# Patient Record
Sex: MALE | Race: WHITE | NOT HISPANIC OR LATINO | Employment: PART TIME | URBAN - METROPOLITAN AREA
[De-identification: names, ages, dates, MRNs, and addresses within clinical notes are randomized per-mention and may not be internally consistent; named-entity substitution may affect disease eponyms.]

---

## 2017-06-05 ENCOUNTER — TRANSCRIBE ORDERS (OUTPATIENT)
Dept: SLEEP CENTER | Facility: CLINIC | Age: 57
End: 2017-06-05

## 2017-06-05 ENCOUNTER — HOSPITAL ENCOUNTER (OUTPATIENT)
Dept: SLEEP CENTER | Facility: CLINIC | Age: 57
Discharge: HOME | End: 2017-06-05
Payer: COMMERCIAL

## 2017-06-05 DIAGNOSIS — G47.33 OBSTRUCTIVE SLEEP APNEA (ADULT) (PEDIATRIC): ICD-10-CM

## 2017-06-05 DIAGNOSIS — G47.33 OBSTRUCTIVE SLEEP APNEA (ADULT) (PEDIATRIC): Primary | ICD-10-CM

## 2018-03-06 ENCOUNTER — OFFICE VISIT (OUTPATIENT)
Dept: FAMILY MEDICINE CLINIC | Facility: CLINIC | Age: 58
End: 2018-03-06
Payer: COMMERCIAL

## 2018-03-06 VITALS
WEIGHT: 187.6 LBS | HEART RATE: 80 BPM | TEMPERATURE: 97.7 F | HEIGHT: 68 IN | BODY MASS INDEX: 28.43 KG/M2 | SYSTOLIC BLOOD PRESSURE: 124 MMHG | DIASTOLIC BLOOD PRESSURE: 68 MMHG

## 2018-03-06 DIAGNOSIS — M54.50 ACUTE RIGHT-SIDED LOW BACK PAIN WITHOUT SCIATICA: Primary | ICD-10-CM

## 2018-03-06 DIAGNOSIS — K42.9 UMBILICAL HERNIA WITHOUT OBSTRUCTION AND WITHOUT GANGRENE: ICD-10-CM

## 2018-03-06 PROCEDURE — 3008F BODY MASS INDEX DOCD: CPT | Performed by: FAMILY MEDICINE

## 2018-03-06 PROCEDURE — 99213 OFFICE O/P EST LOW 20 MIN: CPT | Performed by: FAMILY MEDICINE

## 2018-03-06 RX ORDER — ETANERCEPT 50 MG/ML
SOLUTION SUBCUTANEOUS
COMMUNITY
Start: 2018-02-23 | End: 2022-03-22 | Stop reason: SDUPTHER

## 2018-03-06 RX ORDER — METHOCARBAMOL 500 MG/1
TABLET, FILM COATED ORAL
Qty: 30 TABLET | Refills: 1 | Status: SHIPPED | OUTPATIENT
Start: 2018-03-06 | End: 2018-10-01

## 2018-03-06 RX ORDER — AMINO ACIDS/MV,IRON,MIN
1 TABLET ORAL DAILY
COMMUNITY

## 2018-03-06 NOTE — PROGRESS NOTES
FAMILY PRACTICE OFFICE VISIT       NAME: Ashley Reyes  AGE: 59 y o  SEX: male       : 1960        MRN: [id number]    DATE: 3/6/2018  TIME: 12:17 PM    Assessment and Plan     Problem List Items Addressed This Visit     Umbilical hernia     Umbilical hernia  Patient will follow up with general surgeon to have hernia repair in the near future  Acute right-sided low back pain without sciatica - Primary    Relevant Medications    methocarbamol (ROBAXIN) 500 mg tablet          Patient given prescription for muscle relaxer for his back to use t i d  p r n  He may also use heat to the affected area  The patient will be holding off on any strenuous activities that he was performing previous to his umbilical hernia symptoms  Patient may use over-the-counter Aleve with food for pain relief  There are no Patient Instructions on file for this visit  Chief Complaint     Chief Complaint   Patient presents with    Follow-up     Pt was evaluated in [facility] ER on 2018 for hernia  Pt states that they replaced the hernia into place and suggested he f/u with PCP  Pt states that he was prescribed medication for pain but did not have the script filled  History of Present Illness     Patient has lost 30 pound with diet and exercise over the past 3 months  Unfortunately he had a prior umbilical hernia that became symptomatic and was seen in emergency room  Since emergency room visit he has been feeling some right-sided back pain which she feels is related to his change in gait  He denies any acute trauma  He is scheduled to see general surgeon soon to schedule her hernia surgery  Review of Systems   Review of Systems   Constitutional: Negative  HENT: Negative  Eyes: Negative  Respiratory: Negative  Cardiovascular: Negative  Gastrointestinal: Negative  Genitourinary: Negative  Musculoskeletal:        As per HPI    Patient states overall his psoriatic arthritic pains have been improved since the weight loss that he has not been using his Enbrel injections for few weeks now   Skin: Negative  Active Problem List     Patient Active Problem List   Diagnosis    Gastroesophageal reflux disease with esophagitis    GERD (gastroesophageal reflux disease)    Obstructive sleep apnea    Psoriasis    [facility]atic arthritis ([facility] )    Sjogren's syndrome ([facility] )    Umbilical hernia    Acute right-sided low back pain without sciatica       Past Medical History:  No past medical history on file  Past Surgical History:  No past surgical history on file  Family History:  No family history on file  Social History:  Social History     Social History    Marital status: /Civil Union     Spouse name: N/A    Number of children: N/A    Years of education: N/A     Occupational History    Not on file  Social History Main Topics    Smoking status: Current Every Day Smoker    Smokeless tobacco: Never Used    Alcohol use Not on file    Drug use: Unknown    Sexual activity: Not on file     Other Topics Concern    Not on file     Social History Narrative    No narrative on file       Objective     Vitals:    03/06/18 1132   BP: 124/68   Pulse: 80   Temp: 97 7 °F (36 5 °C)     Wt Readings from Last 3 Encounters:   03/06/18 85 1 kg (187 lb 9 6 oz)   08/04/15 93 5 kg (206 lb 2 1 oz)   02/13/15 93 2 kg (205 lb 8 oz)       Physical Exam   Constitutional: No distress  Cardiovascular:   Regular rate and rhythm with no murmurs   Pulmonary/Chest:   Lungs are clear to auscultation without wheezes,rales, or rhonchi   Abdominal:   Abdomen was soft with positive bowel sounds  Patient had mild generalized tenderness along the umbilical area where hernia present  No guarding or rebound    No masses palpated       Pertinent Laboratory/Diagnostic Studies:  Lab Results   Component Value Date    GLUCOSE 100 07/31/2014    BUN 13 07/31/2014    CREATININE 0 84 07/31/2014    CALCIUM 9 6 07/31/2014     07/31/2014    K 4 4 07/31/2014    CO2 31 07/31/2014     07/31/2014     Lab Results   Component Value Date    ALT 35 07/31/2014    AST 24 07/31/2014    ALKPHOS 99 07/31/2014    BILITOT 0 80 07/31/2014       Lab Results   Component Value Date    WBC 6 73 07/31/2014    HGB 15 4 07/31/2014    HCT 45 8 07/31/2014    MCV 92 07/31/2014     07/31/2014       No results found for: TSH    Lab Results   Component Value Date    CHOL 215 09/30/2014     Lab Results   Component Value Date    TRIG 134 09/30/2014     Lab Results   Component Value Date    HDL 47 09/30/2014     Lab Results   Component Value Date    LDLCALC 141 (H) 09/30/2014     No results found for: HGBA1C    Results for orders placed or performed in visit on 09/30/14   URINALYSIS WITH REFLEX TO MICROSCOPIC (HISTORICAL)   Result Value Ref Range    Color, UA Yellow     Clarity, UA Clear     Specific [place], UA 1 024 1 003 - 1 030    pH, UA 7 0 4 5 - 8 0    Glucose, UA Negative Negative mg/dL    Ketones, UA Negative Negative mg/dL    Blood, UA Negative Negative    Protein, UA Negative Negative mg/dL    Nitrite, UA Negative Negative    Bilirubin, UA Negative Negative    Urobilinogen, UA 0 2 0 2,1 0 E U /dl    Leukocytes, UA Negative Negative   PSA,TOTAL SCREEN (HISTORICAL)   Result Value Ref Range    PSA 0 9 0 0 - 4 0 ng/mL   LIPID PANEL (HISTORICAL)   Result Value Ref Range    Triglycerides 134 mg/dL    Cholesterol 215 mg/dL    HDL 47 mg/dL    LDL Calculated 141 (H) 0 - 100 mg/dL       No orders of the defined types were placed in this encounter  ALLERGIES:  No Known Allergies    Current Medications     Current Outpatient Prescriptions   Medication Sig Dispense Refill    ENBREL 50 MG/ML SOSY       Multiple Vitamins-Minerals (OCUVITE EXTRA) TABS Take 1 tablet by mouth daily      methocarbamol (ROBAXIN) 500 mg tablet One po tid prn 30 tablet 1     No current facility-administered medications for this visit            Health Maintenance     Health Maintenance   Topic Date Due    HIV SCREENING  1960    Hepatitis C Screening  1960    COLONOSCOPY  1960    Depression Screening PHQ-9  05/22/1970    DTaP,Tdap,and Td Vaccines (3 - Tdap) 02/04/2016    INFLUENZA VACCINE  09/01/2017    PNEUMOCOCCAL POLYSACCHARIDE VACCINE AGE 2-64 HIGH RISK  Completed     Immunization History   Administered Date(s) Administered    Pneumococcal Polysaccharide PPV23 11/21/2005, 08/04/2015    Td (adult), adsorbed 10/01/2005, 57/01/3755       Avinash Leung MD

## 2018-03-06 NOTE — ASSESSMENT & PLAN NOTE
Umbilical hernia  Patient will follow up with general surgeon to have hernia repair in the near future

## 2018-06-14 ENCOUNTER — OFFICE VISIT (OUTPATIENT)
Dept: SLEEP CENTER | Facility: CLINIC | Age: 58
End: 2018-06-14
Payer: COMMERCIAL

## 2018-06-14 VITALS
SYSTOLIC BLOOD PRESSURE: 114 MMHG | HEART RATE: 80 BPM | BODY MASS INDEX: 26.04 KG/M2 | HEIGHT: 68 IN | WEIGHT: 171.8 LBS | DIASTOLIC BLOOD PRESSURE: 60 MMHG

## 2018-06-14 DIAGNOSIS — G47.33 OBSTRUCTIVE SLEEP APNEA: Primary | ICD-10-CM

## 2018-06-14 PROCEDURE — 99214 OFFICE O/P EST MOD 30 MIN: CPT | Performed by: NURSE PRACTITIONER

## 2018-06-14 RX ORDER — RANITIDINE 150 MG/1
150 CAPSULE ORAL
COMMUNITY
End: 2020-06-03 | Stop reason: ALTCHOICE

## 2018-06-14 NOTE — PATIENT INSTRUCTIONS
1   Continue use of CPAP equipment nightly  1 5  Take equipment to [facility] for pressure change and download after one month at 7cm  2   Continue to clean your equipment as discussed  3  Contact the [facility] with any questions or concerns prior to his next visit as needed  4    Schedule visit for follow-up in 1 year

## 2018-06-14 NOTE — PROGRESS NOTES
Progress Note - [address] [facility] 59 y o  male   QAS:[id number]/56/7406, MRN: [id number]  6/14/2018          Follow Up Evaluation / Problem:     Obstructive Sleep Apnea    HPI: Ced Zuluaga is a 59y o  year old male  He presents today for follow up of obstructive sleep apnea  He was diagnosed with very mild sleep apnea in 2007  He has successfully used CPAP since that time, which controls his snoring and allows him to awaken feeling refreshed  He reports that he receives supplies from [employer] and cleans his equipment routinely  Review of Systems      Genitourinary none   Cardiology none   Gastrointestinal none   Neurology none   Constitutional weight change   Integumentary none   Psychiatry none   Musculoskeletal joint pain and back pain   Pulmonary none   ENT none   Endocrine none   Hematological none       Current Outpatient Prescriptions:     ENBREL 50 MG/ML SOSY, , Disp: , Rfl:     Misc Natural Products (PROSTATE HEALTH PO), Take by mouth, Disp: , Rfl:     Multiple Vitamins-Minerals (CENTRUM SILVER 50+MEN) TABS, Take 1 tablet by mouth, Disp: , Rfl:     Multiple Vitamins-Minerals (OCUVITE EXTRA) TABS, Take 1 tablet by mouth daily, Disp: , Rfl:     ranitidine (ZANTAC) 150 MG capsule, Take 150 mg by mouth, Disp: , Rfl:     methocarbamol (ROBAXIN) 500 mg tablet, One po tid prn, Disp: 30 tablet, Rfl: 1    Gray Sleepiness Scale  Sitting and reading: Would never doze  Watching TV: Would never doze  Sitting, inactive in a public place (e g  a theatre or a meeting):  Would never doze  As a passenger in a car for an hour without a break: Would never doze  Lying down to rest in the afternoon when circumstances permit: Would never doze  Sitting and talking to someone: Would never doze  Sitting quietly after a lunch without alcohol: Would never doze  In a car, while stopped for a few minutes in traffic: Would never doze  Total score: 0              Vitals:    06/14/18 0800   BP: 114/60   Pulse: 80   Weight: 77 9 kg (171 lb 12 8 oz)   Height: 5' 8" (1 727 m)       Body mass index is 26 12 kg/m²  Neck Circumference: 36 5 (cm)       EPWORTH SLEEPINESS SCORE  Total score: 0      Past History Since Last Sleep Center Visit:     Since his last visit, he reports that he has lost approximately 50 lbs  using lifestyle changes to diet and exercise  He continues to use his CPAP nightly and has not noticed any increased pressure sensation with its use  He feels refreshed upon awakening  The review of systems and following portions of the patient's history were reviewed and updated as appropriate: allergies, current medications, past family history, past medical history, past social history, past surgical history, and problem list     OBJECTIVE    PAP Pressure: Nasal CPAP set to deliver 11 cm of water pressure plan decrease to 7cm  DME Provider: [facility]    Physical Exam:     General Appearance:   Alert, cooperative, no distress, appears stated age     Head:   Normocephalic, without obvious abnormality, atraumatic     Eyes:   PERRL, conjunctiva/corneas clear, EOM's intact          Nose:  Nares normal, septum midline, no drainage or sinus tenderness           Throat:  Lips, teeth and gums normal; tongue normal size and  shape and midline mucosa moist and redundant bilaterally, uvula thick and partially visible, tonsils not visualized, Mallampati class 4       Neck:  Supple, symmetrical, trachea midline, no adenopathy;  Thyroid: No enlargement, tenderness or nodules; no carotid bruit or JVD     Lungs:      Clear to auscultation bilaterally, respirations unlabored     Heart:   Regular rate and rhythm, S1 and S2 normal, no murmur, rub or gallop       Extremities:  Extremities normal, atraumatic, no cyanosis or edema     Pulses:  2+ and symmetric all extremities     Skin:  Skin color, texture, turgor normal, no rashes or lesions       Neurologic:  CNII-XII intact  Normal strength, sensation throughout       ASSESSMENT / PLAN    1  Obstructive sleep apnea  PAP DME Pressure Change    PAP DME Resupply/Reorder           Counseling / Coordination of Care  Total clinic time spent today 25 minutes  Greater than 50% of total time was spent with the patient and / or family counseling and / or coordination of care  A description of the counseling / coordination of care:     Impressions, Diagnostic results, Prognosis, Instructions for management, Risks and benefits of treatment, Patient and family education, Risk factor reductions and Importance of compliance with treatment    Today we discussed his continued use of CPAP  He currently feels comfortable using CPAP  We reviewed his sleep study results from 2007 which showed very mild obstructive sleep apnea  With the significant amount of weight that he has lost, it is possible that he does not need to use his equipment anymore  He is not sure that he wants to completely stop using it at this time  He does not want to complete a sleep study at this time  We discussed empirically decreasing the pressure to 7cm of water pressure and rechecking his AHI at the lower pressure  He will take his equipment to [employer], his supplier to recheck his compliance report after one month of use at 7cm  Supplies have been ordered for the next year  He will follow up in one year, or sooner, if needed  The following instructions have been given to the patient today:    Patient Instructions   1  Continue use of CPAP equipment nightly  1 5  Take equipment to [employer] for pressure change and download after one month at 7cm  2   Continue to clean your equipment as discussed  3  Contact the [facility] Center with any questions or concerns prior to her next visit as needed  4    Schedule visit for follow-up in 1 year        Darylene Ness, [address] [place] [address]

## 2018-07-30 ENCOUNTER — TELEPHONE (OUTPATIENT)
Dept: SLEEP CENTER | Facility: CLINIC | Age: 58
End: 2018-07-30

## 2018-07-30 NOTE — TELEPHONE ENCOUNTER
----- Message from Mulugeta Smith sent at 6/14/2018  5:55 PM EDT -----  Regarding: check compliance  Contact carlos regarding compliance check and effectiveness after pressure change to 7cm (ordered 6/14/18)  Patient will need to take equipment into [facility] for download

## 2018-07-30 NOTE — TELEPHONE ENCOUNTER
Called patient and left message regarding Yakelin's note  Asked pt to have equipment taken to [facility] so that we can have a compliance download

## 2018-08-01 NOTE — TELEPHONE ENCOUNTER
Pt called back and left message stating that his cpap "is not downloadable" Pt stated that he doesn't have a card in his machine

## 2018-10-01 ENCOUNTER — OFFICE VISIT (OUTPATIENT)
Dept: FAMILY MEDICINE CLINIC | Facility: CLINIC | Age: 58
End: 2018-10-01
Payer: COMMERCIAL

## 2018-10-01 VITALS
HEIGHT: 68 IN | SYSTOLIC BLOOD PRESSURE: 110 MMHG | DIASTOLIC BLOOD PRESSURE: 60 MMHG | RESPIRATION RATE: 16 BRPM | BODY MASS INDEX: 25.55 KG/M2 | HEART RATE: 56 BPM | WEIGHT: 168.6 LBS | TEMPERATURE: 97.4 F

## 2018-10-01 DIAGNOSIS — R07.81 RIB PAIN ON LEFT SIDE: Primary | ICD-10-CM

## 2018-10-01 DIAGNOSIS — S20.212A CONTUSION OF RIB ON LEFT SIDE, INITIAL ENCOUNTER: ICD-10-CM

## 2018-10-01 DIAGNOSIS — M54.6 THORACIC BACK PAIN, UNSPECIFIED BACK PAIN LATERALITY, UNSPECIFIED CHRONICITY: ICD-10-CM

## 2018-10-01 PROCEDURE — 99213 OFFICE O/P EST LOW 20 MIN: CPT | Performed by: FAMILY MEDICINE

## 2018-10-01 NOTE — ASSESSMENT & PLAN NOTE
Rib contusion  I suspect patient pulled an intercostal muscle which is causing his rib pain however we will check x-rays of ribs and thoracic spine for further evaluation  He will continue with Aleve and warm compresses to the affected area     Overall patient fell symptoms had been improving over the past few days

## 2018-10-01 NOTE — PROGRESS NOTES
FAMILY PRACTICE OFFICE VISIT       NAME: Celeste Joyner  AGE: 59 y o  SEX: male       : 1960        MRN: [id number]    DATE: 10/1/2018  TIME: 5:48 PM    Assessment and Plan     Problem List Items Addressed This Visit     Contusion of rib on left side     Rib contusion  I suspect patient pulled an intercostal muscle which is causing his rib pain however we will check x-rays of ribs and thoracic spine for further evaluation  He will continue with Aleve and warm compresses to the affected area  Overall patient fell symptoms had been improving over the past few days           Other Visit Diagnoses     Rib pain on left side    -  Primary    Relevant Orders    XR ribs 2 vw left    Thoracic back pain, unspecified back pain laterality, unspecified chronicity        Relevant Orders    XR spine thoracic 3 vw            There are no Patient Instructions on file for this visit  Chief Complaint     Chief Complaint   Patient presents with    Back Pain     Pt is here w/ c/o left back pain     Chest Pain     Pt is here w/ c/o left rib pain        History of Present Illness     Patient states he you was on vacation recently in the woods and came across large tree trunks while 4 wheeling  Upon pulling to remove treat trunk swim the road he felt acute onset of left rib discomfort  He does have a history of psoriatic arthritis as well as history of thoracic spine compression fracture  He denies any shortness of breath but does feel more discomfort with deep inhalations  The patient has been using a Aleve for which she uses periodically for his psoriatic arthritis        Review of Systems   Review of Systems   Constitutional: Negative  Respiratory: Positive for chest tightness  Negative for shortness of breath and wheezing  Cardiovascular: Negative      Musculoskeletal:        As per HPI       Active Problem List     Patient Active Problem List   Diagnosis    Gastroesophageal reflux disease with esophagitis    GERD (gastroesophageal reflux disease)    Obstructive sleep apnea    Psoriasis    Psoriatic arthritis (H[facility])    Sjogren's syndrome ([address] )    Umbilical hernia    Acute right-sided low back pain without sciatica    Contusion of rib on left side       Past Medical History:  History reviewed  No pertinent past medical history  Past Surgical History:  History reviewed  No pertinent surgical history  Family History:  Family History   Problem Relation Age of Onset    Heart disease Father        Social History:  Social History     Social History    Marital status: /Civil Union     Spouse name: N/A    Number of children: N/A    Years of education: N/A     Occupational History    Not on file  Social History Main Topics    Smoking status: Current Every Day Smoker    Smokeless tobacco: Never Used      Comment: vape / never a smoker per allscript     Alcohol use Yes      Comment: weekends / social per allscript     Drug use: No    Sexual activity: Not on file     Other Topics Concern    Not on file     Social History Narrative    No narrative on file       Objective     Vitals:    10/01/18 1559   BP: 110/60   Pulse: 56   Resp: 16   Temp: (!) 97 4 °F (36 3 °C)     Wt Readings from Last 3 Encounters:   10/01/18 76 5 kg (168 lb 9 6 oz)   06/14/18 77 9 kg (171 lb 12 8 oz)   03/06/18 85 1 kg (187 lb 9 6 oz)       Physical Exam   Constitutional: No distress  Cardiovascular:   Regular rate and rhythm with no murmurs   Pulmonary/Chest:   Lungs are clear to auscultation without wheezes,rales, or rhonchi   Musculoskeletal:   Patient with mild tenderness to palpation along left lower ribcage  Skin: No rash noted         Pertinent Laboratory/Diagnostic Studies:  Lab Results   Component Value Date    GLUCOSE 100 07/31/2014    BUN 13 07/31/2014    CREATININE 0 84 07/31/2014    CALCIUM 9 6 07/31/2014     07/31/2014    K 4 4 07/31/2014    CO2 31 07/31/2014     07/31/2014     Lab Results   Component Value Date    ALT 35 07/31/2014    AST 24 07/31/2014    ALKPHOS 99 07/31/2014    BILITOT 0 80 07/31/2014       Lab Results   Component Value Date    WBC 6 73 07/31/2014    HGB 15 4 07/31/2014    HCT 45 8 07/31/2014    MCV 92 07/31/2014     07/31/2014       No results found for: TSH    Lab Results   Component Value Date    CHOL 215 09/30/2014     Lab Results   Component Value Date    TRIG 134 09/30/2014     Lab Results   Component Value Date    HDL 47 09/30/2014     Lab Results   Component Value Date    LDLCALC 141 (H) 09/30/2014     No results found for: HGBA1C    No results found for this or any previous visit  Orders Placed This Encounter   Procedures    XR spine thoracic 3 vw    XR ribs 2 vw left       ALLERGIES:  No Known Allergies    Current Medications     Current Outpatient Prescriptions   Medication Sig Dispense Refill    ENBREL 50 MG/ML SOSY       Misc Natural Products (PROSTATE HEALTH PO) Take by mouth      Multiple Vitamins-Minerals (CENTRUM SILVER 50+MEN) TABS Take 1 tablet by mouth      Multiple Vitamins-Minerals (OCUVITE EXTRA) TABS Take 1 tablet by mouth daily      ranitidine (ZANTAC) 150 MG capsule Take 150 mg by mouth       No current facility-administered medications for this visit            Health Maintenance     Health Maintenance   Topic Date Due    DTaP,Tdap,and Td Vaccines (1 - Tdap) 08/05/2015    INFLUENZA VACCINE  09/01/2018    Depression Screening PHQ  11/01/2020 (Originally 1960)    CRC Screening: Colonoscopy  11/01/2018 (Originally 1960)    Pneumococcal PPSV23 Medium Risk Adult  Completed     Immunization History   Administered Date(s) Administered    Influenza 11/06/2012, 02/18/2013, 02/19/2014, 02/11/2015, 10/09/2017    Pneumococcal Polysaccharide PPV23 11/21/2005, 08/04/2015    Td (adult), adsorbed 10/01/2005, 82/28/9029       Avinash Leung MD

## 2018-10-08 ENCOUNTER — TELEPHONE (OUTPATIENT)
Dept: FAMILY MEDICINE CLINIC | Facility: CLINIC | Age: 58
End: 2018-10-08

## 2019-10-04 ENCOUNTER — TELEPHONE (OUTPATIENT)
Dept: SLEEP CENTER | Facility: CLINIC | Age: 59
End: 2019-10-04

## 2019-10-11 ENCOUNTER — OFFICE VISIT (OUTPATIENT)
Dept: SLEEP CENTER | Facility: CLINIC | Age: 59
End: 2019-10-11
Payer: COMMERCIAL

## 2019-10-11 VITALS
HEIGHT: 68 IN | BODY MASS INDEX: 26.22 KG/M2 | DIASTOLIC BLOOD PRESSURE: 83 MMHG | SYSTOLIC BLOOD PRESSURE: 132 MMHG | WEIGHT: 173 LBS | HEART RATE: 74 BPM

## 2019-10-11 DIAGNOSIS — G47.33 OBSTRUCTIVE SLEEP APNEA: Primary | ICD-10-CM

## 2019-10-11 PROCEDURE — 99214 OFFICE O/P EST MOD 30 MIN: CPT | Performed by: PSYCHIATRY & NEUROLOGY

## 2019-10-11 NOTE — PATIENT INSTRUCTIONS
1  Provide prescription for new CPAP supplies last over the next 12 months  2  Contact the [facility] if any problems arise prior to next visit  3  Return in 1 year for routine re-evaluation and review of compliance data       Nursing Support:  When: Monday through Friday 7A-5PM except holidays  Where: Our direct line is [phone]  If you are having a true emergency please call 911  In the event that the line is busy or it is after hours please leave a voice message and we will return your call  Please speak clearly, leaving your full name, birth date, best number to reach you and the reason for your call  Medication refills: We will need the name of the medication, the dosage, the ordering provider, whether you get a 30 or 90 day refill, and the pharmacy name and address  Medications will be ordered by the provider only  Nurses cannot call in prescriptions  Please allow 7 days for medication refills  Physician requested updates: If your provider requested that you call with an update after starting medication, please be ready to provide us the medication and dosage, what time you take your medication, the time you attempt to fall asleep, time you fall asleep, when you wake up, and what time you get out of bed  Sleep Study Results: We will contact you with sleep study results and/or next steps after the physician has reviewed your testing

## 2019-10-11 NOTE — PROGRESS NOTES
Progress Note - [address] 62 y o  male   CNV:[id number], MRN: [id number]  10/11/2019          Follow Up Evaluation / Problem:     Obstructive Sleep Apnea      Thank you for the opportunity of participating in the evaluation and care of this patient in the Sleep Clinic at [facility]  HPI: Jer Landis is a 62y o  year old male  He has a long history of obstructive sleep apnea and has been using nasal CPAP as treatment for this problem  He has had excellent compliance and is continuing to receive benefit from treatment  Current Outpatient Medications:     ENBREL 50 MG/ML SOSY, , Disp: , Rfl:     Misc Natural Products (PROSTATE HEALTH PO), Take by mouth, Disp: , Rfl:     Multiple Vitamins-Minerals (CENTRUM SILVER 50+MEN) TABS, Take 1 tablet by mouth, Disp: , Rfl:     Multiple Vitamins-Minerals (OCUVITE EXTRA) TABS, Take 1 tablet by mouth daily, Disp: , Rfl:     ranitidine (ZANTAC) 150 MG capsule, Take 150 mg by mouth, Disp: , Rfl:     Hornick Sleepiness Scale  Sitting and reading: Would never doze  Watching TV: Would never doze  Sitting, inactive in a public place (e g  a theatre or a meeting): Would never doze  As a passenger in a car for an hour without a break: Would never doze  Lying down to rest in the afternoon when circumstances permit: Would never doze  Sitting and talking to someone: Would never doze  Sitting quietly after a lunch without alcohol: Would never doze  In a car, while stopped for a few minutes in traffic: Would never doze  Total score: 0              Vitals:    10/11/19 0800   BP: 132/83   Pulse: 74   Weight: 78 5 kg (173 lb)   Height: 5' 8" (1 727 m)       Body mass index is 26 3 kg/m²      Neck Circumference: 14       EPWORTH SLEEPINESS SCORE  Total score: 0      Past History Since Last Sleep Center Visit:     When last seen in June of 2018 he was using 11 cm of water pressure  After losing a significant amount of weight his pressure was turned down to 7 cm of water due to complaints of air leaks and discomfort  He has done quite well since that time  His weight is stabilized at 173 lb  At the time he was last seen use 171 lb  He denies any new problems  A review of his machine today shows that over the last 30 days he has used his equipment nightly for greater than 4 hours  He reports that he has difficulty sleeping without his CPAP equipment in place  When not using CPAP he snores loudly  He feels awake and alert during the day and has no new issues to report  Today he will receive a prescription for new CPAP supplies which will last over the next 12 months  The review of systems and following portions of the patient's history were reviewed and updated as appropriate: allergies, current medications, past family history, past medical history, past social history, past surgical history, and problem list     Review of Systems      Genitourinary none   Cardiology none   Gastrointestinal none   Neurology none   Constitutional none   Integumentary none   Psychiatry anxiety   Musculoskeletal joint pain   Pulmonary none   ENT none   Endocrine none   Hematological none       OBJECTIVE    PAP Pressure: Nasal CPAP set to deliver 7 cm of water pressure  DME Provider: [facility]    Physical Exam:     General Appearance:   Alert, cooperative, no distress, appears stated age     Head:   Normocephalic, without obvious abnormality, atraumatic     Eyes:   PERRL, conjunctiva/corneas clear, EOM's intact          Nose:  Nares normal, septum midline, no drainage or sinus tenderness     Neck:  Supple, symmetrical, trachea midline, no adenopathy;  Thyroid: No enlargement, tenderness or nodules; no carotid bruit or JVD       Extremities:  Extremities normal, atraumatic, no cyanosis or edema     Pulses:  2+ and symmetric all extremities     Skin:  Skin color, texture, turgor normal, no rashes or lesions       Neurologic:  CNII-XII intact  Normal strength, sensation throughout       ASSESSMENT / PLAN    1  Obstructive sleep apnea  PAP DME Resupply/Reorder           Counseling / Coordination of Care  Total clinic time spent today 25 minutes  Greater than 50% of total time was spent with the patient and / or family counseling and / or coordination of care  A description of the counseling / coordination of care:     Impressions, Diagnostic results, Prognosis, Instructions for management, Risks and benefits of treatment, Patient and family education, Risk factor reductions and Importance of compliance with treatment    The following instructions have been given to the patient today:    Patient Instructions   1  Provide prescription for new CPAP supplies last over the next 12 months  2  Contact the [facility] if any problems arise prior to next visit  3  Return in 1 year for routine re-evaluation and review of compliance data       Nursing Support:  When: Monday through Friday 7A-5PM except holidays  Where: Our direct line is [phone]  If you are having a true emergency please call 911  In the event that the line is busy or it is after hours please leave a voice message and we will return your call  Please speak clearly, leaving your full name, birth date, best number to reach you and the reason for your call  Medication refills: We will need the name of the medication, the dosage, the ordering provider, whether you get a 30 or 90 day refill, and the pharmacy name and address  Medications will be ordered by the provider only  Nurses cannot call in prescriptions  Please allow 7 days for medication refills  Physician requested updates:  If your provider requested that you call with an update after starting medication, please be ready to provide us the medication and dosage, what time you take your medication, the time you attempt to fall asleep, time you fall asleep, when you wake up, and what time you get out of bed  Sleep Study Results: We will contact you with sleep study results and/or next steps after the physician has reviewed your testing            Nikita Lewis

## 2019-10-17 ENCOUNTER — TELEPHONE (OUTPATIENT)
Dept: SLEEP CENTER | Facility: CLINIC | Age: 59
End: 2019-10-17

## 2019-10-23 ENCOUNTER — OFFICE VISIT (OUTPATIENT)
Dept: FAMILY MEDICINE CLINIC | Facility: CLINIC | Age: 59
End: 2019-10-23
Payer: COMMERCIAL

## 2019-10-23 VITALS
SYSTOLIC BLOOD PRESSURE: 110 MMHG | DIASTOLIC BLOOD PRESSURE: 90 MMHG | WEIGHT: 174.2 LBS | HEART RATE: 68 BPM | BODY MASS INDEX: 26.49 KG/M2 | TEMPERATURE: 98.3 F | OXYGEN SATURATION: 96 %

## 2019-10-23 DIAGNOSIS — H81.13 BENIGN PAROXYSMAL POSITIONAL VERTIGO DUE TO BILATERAL VESTIBULAR DISORDER: Primary | ICD-10-CM

## 2019-10-23 PROCEDURE — 99213 OFFICE O/P EST LOW 20 MIN: CPT | Performed by: FAMILY MEDICINE

## 2019-10-23 RX ORDER — MECLIZINE HCL 12.5 MG/1
12.5 TABLET ORAL 3 TIMES DAILY PRN
Qty: 30 TABLET | Refills: 0 | Status: SHIPPED | OUTPATIENT
Start: 2019-10-23 | End: 2022-03-22 | Stop reason: ALTCHOICE

## 2019-10-23 NOTE — PROGRESS NOTES
FAMILY PRACTICE OFFICE VISIT       NAME: Robyn Gandhi  AGE: 62 y o  SEX: male       : 1960        MRN: [id number]    DATE: 10/23/2019  TIME: 8:52 AM    Assessment and Plan     Problem List Items Addressed This Visit        Nervous and Auditory    Benign paroxysmal positional vertigo due to bilateral vestibular disorder - Primary     Positional vertigo  Patient given instructions on performing Epley exercises at least twice daily  He was also given meclizine 12 5 mg to use t i d  P r n  Darlene Sebastian Patient will call if symptoms persist without change over the next 7-10 days         Relevant Medications    meclizine (ANTIVERT) 12 5 MG tablet              Chief Complaint     Chief Complaint   Patient presents with    Dizziness     fatigue, lightheaded,  "Hot Flash" for about a week after working on bathroom sink       History of Present Illness     Patient reports fixing something under his sink last week in and throughout the day began experiencing feeling of off balance  He denies any recent illness  Symptoms are fairly persistent throughout the day although mild  Normally he does balance exercises which have been a little more for difficult to perform since this new symptom began  Review of Systems   Review of Systems   Constitutional: Negative  Respiratory: Negative  Cardiovascular: Negative  Gastrointestinal: Negative  Neurological: Positive for dizziness and light-headedness  Negative for weakness and headaches  Psychiatric/Behavioral: Negative          Active Problem List     Patient Active Problem List   Diagnosis    Gastroesophageal reflux disease with esophagitis    GERD (gastroesophageal reflux disease)    Obstructive sleep apnea    Psoriasis    Psoriatic arthritis ([address] )    Sjogren's syndrome ([address] )    Umbilical hernia    Acute right-sided low back pain without sciatica    Contusion of rib on left side    Benign paroxysmal positional vertigo due to bilateral vestibular disorder       Past Medical History:  No past medical history on file  Past Surgical History:  No past surgical history on file      Family History:  Family History   Problem Relation Age of Onset    Heart disease Father        Social History:  Social History     Socioeconomic History    Marital status: /Civil Union     Spouse name: Not on file    Number of children: Not on file    Years of education: Not on file    Highest education level: Not on file   Occupational History    Not on file   Social Needs    Financial resource strain: Not on file    Food insecurity:     Worry: Not on file     Inability: Not on file    Transportation needs:     Medical: Not on file     Non-medical: Not on file   Tobacco Use    Smoking status: Former Smoker    Smokeless tobacco: Former User    Tobacco comment: vape / never a smoker per allscript    Substance and Sexual Activity    Alcohol use: Yes     Comment: weekends / social per allscript     Drug use: No    Sexual activity: Not on file   Lifestyle    Physical activity:     Days per week: Not on file     Minutes per session: Not on file    Stress: Not on file   Relationships    Social connections:     Talks on phone: Not on file     Gets together: Not on file     Attends Buddhist service: Not on file     Active member of club or organization: Not on file     Attends meetings of clubs or organizations: Not on file     Relationship status: Not on file    Intimate partner violence:     Fear of current or ex partner: Not on file     Emotionally abused: Not on file     Physically abused: Not on file     Forced sexual activity: Not on file   Other Topics Concern    Not on file   Social History Narrative    Not on file       Objective     Vitals:    10/23/19 0824   BP: 110/90   Pulse: 68   Temp: 98 3 °F (36 8 °C)   SpO2: 96%     Wt Readings from Last 3 Encounters:   10/23/19 79 kg (174 lb 3 2 oz)   10/11/19 78 5 kg (173 lb)   10/01/18 76 5 kg (168 lb 9 6 oz) Physical Exam   Constitutional: He is oriented to person, place, and time  No distress  HENT:   Right Ear: External ear normal    Left Ear: External ear normal    Mouth/Throat: Oropharynx is clear and moist  No oropharyngeal exudate  Tympanic membranes within normal limits bilaterally   Cardiovascular:   Regular rate and rhythm with no murmurs   Pulmonary/Chest:   Lungs are clear to auscultation without wheezes,rales, or rhonchi   Neurological: He is alert and oriented to person, place, and time  No cranial nerve deficit  Minimal increase in symptoms during Hallpike Redlake maneuver when sitting back up from lying down position   Psychiatric: He has a normal mood and affect  His behavior is normal  Judgment and thought content normal        Pertinent Laboratory/Diagnostic Studies:  Lab Results   Component Value Date    GLUCOSE 100 07/31/2014    BUN 13 07/31/2014    CREATININE 0 84 07/31/2014    CALCIUM 9 6 07/31/2014     07/31/2014    K 4 4 07/31/2014    CO2 31 07/31/2014     07/31/2014     Lab Results   Component Value Date    ALT 35 07/31/2014    AST 24 07/31/2014    ALKPHOS 99 07/31/2014    BILITOT 0 80 07/31/2014       Lab Results   Component Value Date    WBC 6 73 07/31/2014    HGB 15 4 07/31/2014    HCT 45 8 07/31/2014    MCV 92 07/31/2014     07/31/2014       No results found for: TSH    Lab Results   Component Value Date    CHOL 215 09/30/2014     Lab Results   Component Value Date    TRIG 134 09/30/2014     Lab Results   Component Value Date    HDL 47 09/30/2014     Lab Results   Component Value Date    LDLCALC 141 (H) 09/30/2014     No results found for: HGBA1C    No results found for this or any previous visit  No orders of the defined types were placed in this encounter        ALLERGIES:  No Known Allergies    Current Medications     Current Outpatient Medications   Medication Sig Dispense Refill    ENBREL 50 MG/ML SOSY       Misc Natural Products (PROSTATE HEALTH PO) Take by mouth      Multiple Vitamins-Minerals (CENTRUM SILVER 50+MEN) TABS Take 1 tablet by mouth      Multiple Vitamins-Minerals (OCUVITE EXTRA) TABS Take 1 tablet by mouth daily      ranitidine (ZANTAC) 150 MG capsule Take 150 mg by mouth      meclizine (ANTIVERT) 12 5 MG tablet Take 1 tablet (12 5 mg total) by mouth 3 (three) times a day as needed for dizziness 30 tablet 0     No current facility-administered medications for this visit            Health Maintenance     Health Maintenance   Topic Date Due    Hepatitis C Screening  1960    Depression Screening PHQ  1960    BMI: Followup Plan  05/22/1976    DTaP,Tdap,and Td Vaccines (1 - Tdap) 08/05/2015    INFLUENZA VACCINE  07/01/2019    BMI: Adult  10/11/2020    Pneumococcal Vaccine: 65+ Years (1 of 2 - PCV13) 05/22/2023    CRC Screening: Colonoscopy  03/28/2024    Pneumococcal Vaccine: Pediatrics (0 to 5 Years) and At-Risk Patients (6 to 59 Years)  Aged Out    HEPATITIS B VACCINES  Aged [employer] History   Administered Date(s) Administered    INFLUENZA 11/06/2012, 02/18/2013, 02/19/2014, 02/11/2015, 10/09/2017, 10/26/2018    Pneumococcal Polysaccharide PPV23 11/21/2005, 08/04/2015    Td (adult), adsorbed 10/01/2005, 24/98/6151       Pamila Mcburney, MD

## 2019-10-23 NOTE — ASSESSMENT & PLAN NOTE
Positional vertigo  Patient given instructions on performing Epley exercises at least twice daily  He was also given meclizine 12 5 mg to use t i d  P r n  Unk Chris   Patient will call if symptoms persist without change over the next 7-10 days

## 2020-03-16 ENCOUNTER — TELEPHONE (OUTPATIENT)
Dept: FAMILY MEDICINE CLINIC | Facility: CLINIC | Age: 60
End: 2020-03-16

## 2020-03-16 NOTE — TELEPHONE ENCOUNTER
We cannot provide this kind of note for any patient who is not infected or does not require to be under quarantine for significant exposure to someone else who was infected

## 2020-03-16 NOTE — TELEPHONE ENCOUNTER
Patient would like a note for his work to distance himself due to immunity from public contact for 2 weeks due to his age  Please contact patient at [phone]

## 2020-06-03 ENCOUNTER — TELEMEDICINE (OUTPATIENT)
Dept: FAMILY MEDICINE CLINIC | Facility: CLINIC | Age: 60
End: 2020-06-03
Payer: COMMERCIAL

## 2020-06-03 VITALS — TEMPERATURE: 97.2 F | WEIGHT: 161 LBS | BODY MASS INDEX: 24.4 KG/M2 | HEIGHT: 68 IN

## 2020-06-03 DIAGNOSIS — T78.40XA ALLERGIC REACTION, INITIAL ENCOUNTER: Primary | ICD-10-CM

## 2020-06-03 PROCEDURE — 99212 OFFICE O/P EST SF 10 MIN: CPT | Performed by: NURSE PRACTITIONER

## 2020-06-03 PROCEDURE — 3008F BODY MASS INDEX DOCD: CPT | Performed by: NURSE PRACTITIONER

## 2020-10-12 ENCOUNTER — OFFICE VISIT (OUTPATIENT)
Dept: SLEEP CENTER | Facility: CLINIC | Age: 60
End: 2020-10-12
Payer: COMMERCIAL

## 2020-10-12 VITALS
BODY MASS INDEX: 25.91 KG/M2 | SYSTOLIC BLOOD PRESSURE: 118 MMHG | OXYGEN SATURATION: 98 % | DIASTOLIC BLOOD PRESSURE: 64 MMHG | HEART RATE: 52 BPM | WEIGHT: 171 LBS | HEIGHT: 68 IN | TEMPERATURE: 98.6 F

## 2020-10-12 DIAGNOSIS — G47.33 OBSTRUCTIVE SLEEP APNEA: Primary | ICD-10-CM

## 2020-10-12 PROCEDURE — 1036F TOBACCO NON-USER: CPT | Performed by: PSYCHIATRY & NEUROLOGY

## 2020-10-12 PROCEDURE — 99215 OFFICE O/P EST HI 40 MIN: CPT | Performed by: PSYCHIATRY & NEUROLOGY

## 2020-10-12 RX ORDER — PHENOL 1.4 %
AEROSOL, SPRAY (ML) MUCOUS MEMBRANE
COMMUNITY

## 2021-01-29 NOTE — TELEPHONE ENCOUNTER
Recent x-rays show old compression fraction of his thoracic spine but no new changes in spine and no fractures of ribs were found  Health Care Proxy (HCP)

## 2021-03-10 DIAGNOSIS — Z23 ENCOUNTER FOR IMMUNIZATION: ICD-10-CM

## 2021-06-17 ENCOUNTER — VBI (OUTPATIENT)
Dept: ADMINISTRATIVE | Facility: OTHER | Age: 61
End: 2021-06-17

## 2021-10-12 ENCOUNTER — OFFICE VISIT (OUTPATIENT)
Dept: SLEEP CENTER | Facility: CLINIC | Age: 61
End: 2021-10-12
Payer: COMMERCIAL

## 2021-10-12 VITALS
WEIGHT: 173.8 LBS | SYSTOLIC BLOOD PRESSURE: 122 MMHG | HEIGHT: 68 IN | DIASTOLIC BLOOD PRESSURE: 60 MMHG | BODY MASS INDEX: 26.34 KG/M2

## 2021-10-12 DIAGNOSIS — Z99.89 OBSTRUCTIVE SLEEP APNEA TREATED WITH CONTINUOUS POSITIVE AIRWAY PRESSURE (CPAP): Primary | ICD-10-CM

## 2021-10-12 DIAGNOSIS — G47.33 OBSTRUCTIVE SLEEP APNEA TREATED WITH CONTINUOUS POSITIVE AIRWAY PRESSURE (CPAP): Primary | ICD-10-CM

## 2021-10-12 PROCEDURE — 99214 OFFICE O/P EST MOD 30 MIN: CPT | Performed by: NURSE PRACTITIONER

## 2021-10-12 PROCEDURE — 3008F BODY MASS INDEX DOCD: CPT | Performed by: NURSE PRACTITIONER

## 2021-10-12 PROCEDURE — 1036F TOBACCO NON-USER: CPT | Performed by: NURSE PRACTITIONER

## 2021-10-13 ENCOUNTER — TELEPHONE (OUTPATIENT)
Dept: SLEEP CENTER | Facility: CLINIC | Age: 61
End: 2021-10-13

## 2022-03-22 ENCOUNTER — OFFICE VISIT (OUTPATIENT)
Dept: FAMILY MEDICINE CLINIC | Facility: CLINIC | Age: 62
End: 2022-03-22
Payer: COMMERCIAL

## 2022-03-22 VITALS
HEART RATE: 74 BPM | SYSTOLIC BLOOD PRESSURE: 130 MMHG | HEIGHT: 68 IN | OXYGEN SATURATION: 99 % | WEIGHT: 174 LBS | BODY MASS INDEX: 26.37 KG/M2 | DIASTOLIC BLOOD PRESSURE: 70 MMHG | RESPIRATION RATE: 16 BRPM | TEMPERATURE: 98 F

## 2022-03-22 DIAGNOSIS — Z00.00 PERIODIC HEALTH ASSESSMENT, GENERAL SCREENING, ADULT: ICD-10-CM

## 2022-03-22 DIAGNOSIS — R35.1 NOCTURIA: ICD-10-CM

## 2022-03-22 DIAGNOSIS — M25.561 ACUTE PAIN OF BOTH KNEES: Primary | ICD-10-CM

## 2022-03-22 DIAGNOSIS — M25.562 ACUTE PAIN OF BOTH KNEES: Primary | ICD-10-CM

## 2022-03-22 PROCEDURE — 3008F BODY MASS INDEX DOCD: CPT | Performed by: FAMILY MEDICINE

## 2022-03-22 PROCEDURE — 1036F TOBACCO NON-USER: CPT | Performed by: FAMILY MEDICINE

## 2022-03-22 PROCEDURE — 3725F SCREEN DEPRESSION PERFORMED: CPT | Performed by: FAMILY MEDICINE

## 2022-03-22 PROCEDURE — 99396 PREV VISIT EST AGE 40-64: CPT | Performed by: FAMILY MEDICINE

## 2022-03-22 RX ORDER — PREDNISONE 20 MG/1
TABLET ORAL
Qty: 10 TABLET | Refills: 0 | Status: SHIPPED | OUTPATIENT
Start: 2022-03-22

## 2022-03-22 RX ORDER — ACETAMINOPHEN AND CODEINE PHOSPHATE 300; 30 MG/1; MG/1
1 TABLET ORAL EVERY 8 HOURS PRN
Qty: 30 TABLET | Refills: 0 | Status: SHIPPED | OUTPATIENT
Start: 2022-03-22

## 2022-03-22 NOTE — PROGRESS NOTES
FAMILY PRACTICE OFFICE VISIT       NAME: Karyna Lopez  AGE: 59 y o  SEX: male       : 1960        MRN: [id number]    DATE: 3/22/2022  TIME: 9:58 AM    Assessment and Plan     Problem List Items Addressed This Visit        Other    Acute pain of both knees - Primary     Knee pain  Patient will obtain x-ray of bilateral knees  He was given prednisone to use 40 mg once daily for 5 days  He was also given Tylenol No  3 to use every 8 hours as needed for pain management  We will make further recommendations pending results of test          Relevant Medications    predniSONE 20 mg tablet    acetaminophen-codeine (TYLENOL #3) 300-30 mg per tablet    Other Relevant Orders    XR knee 3 vw left non injury    XR knee 3 vw right non injury    Periodic health assessment, general screening, adult     Well adult  Overall the patient appears to be in stable health  His colonoscopy is up-to-date  He will obtain blood work as ordered for further evaluation  Relevant Orders    Lipid panel    TSH, 3rd generation    Comprehensive metabolic panel    PSA, Total Screen      Other Visit Diagnoses     Nocturia        Relevant Orders    PSA, Total Screen              Chief Complaint     Chief Complaint   Patient presents with    Physical Exam     Yearly    Knee Pain     Right sided started during Golf  Left sided beginning as well  Driving and sleeping       History of Present Illness     Patient in the office to have annual wellness exam   Patient states he contracted COVID-19 in 2022 but had a mild course and recovered well  Patient had previously had 2 vaccinations for COVID  He sees his rheumatologist twice a year  He tries to stay active with golfing and has been able to maintain his 50 lb weight loss since  with dietary changes  His last colonoscopy was  and is not due for repeat until       He does have complaints of bilateral knee pains right greater than left after playing hers 1st round of 18 holes of golf  He denies any trauma to the knees  He does take Enbrel every week for his history of psoriatic arthritis  He denies any specific swelling of knees  He also has some right elbow discomfort since playing golf  Review of Systems   Review of Systems   Constitutional: Negative  HENT: Negative  Eyes: Negative  Respiratory: Negative  Cardiovascular: Negative  Gastrointestinal: Negative  Endocrine: Negative  Genitourinary: Negative  Musculoskeletal: Positive for arthralgias and gait problem  Skin: Negative  Allergic/Immunologic: Negative  Hematological: Negative  Psychiatric/Behavioral: Negative  Active Problem List     Patient Active Problem List   Diagnosis    Gastroesophageal reflux disease with esophagitis    GERD (gastroesophageal reflux disease)    Obstructive sleep apnea treated with continuous positive airway pressure (CPAP)    Psoriasis    Psoriatic arthritis ([address] )    Sjogren's syndrome ([address] )    Umbilical hernia    Acute right-sided low back pain without sciatica    Contusion of rib on left side    Benign paroxysmal positional vertigo due to bilateral vestibular disorder    Acute pain of both knees    Periodic health assessment, general screening, adult       Past Medical History:  History reviewed  No pertinent past medical history  Past Surgical History:  History reviewed  No pertinent surgical history      Family History:  Family History   Problem Relation Age of Onset    Heart disease Father        Social History:  Social History     Socioeconomic History    Marital status: /Civil Union     Spouse name: Not on file    Number of children: Not on file    Years of education: Not on file    Highest education level: Not on file   Occupational History    Not on file   Tobacco Use    Smoking status: Former Smoker    Smokeless tobacco: Never Used    Tobacco comment: vape / never a smoker per allscript Vaping Use    Vaping Use: Never used   Substance and Sexual Activity    Alcohol use: Yes     Comment: weekends / social per allscript     Drug use: No    Sexual activity: Not on file   Other Topics Concern    Not on file   Social History Narrative    Not on file     Social Determinants of Health     Financial Resource Strain: Not on file   Food Insecurity: Not on file   Transportation Needs: Not on file   Physical Activity: Not on file   Stress: Not on file   Social Connections: Not on file   Intimate Partner Violence: Not on file   Housing Stability: Not on file       Objective     Vitals:    03/22/22 0916   BP: 130/70   Pulse: 74   Resp: 16   Temp: 98 °F (36 7 °C)   SpO2: 99%     Wt Readings from Last 3 Encounters:   03/22/22 78 9 kg (174 lb)   10/12/21 78 8 kg (173 lb 12 8 oz)   10/12/20 77 6 kg (171 lb)       Physical Exam  Constitutional:       General: He is not in acute distress  Appearance: Normal appearance  He is not ill-appearing  HENT:      Head: Normocephalic and atraumatic  Right Ear: Tympanic membrane, ear canal and external ear normal  There is no impacted cerumen  Left Ear: Tympanic membrane, ear canal and external ear normal  There is no impacted cerumen  Eyes:      General:         Right eye: No discharge  Left eye: No discharge  Extraocular Movements: Extraocular movements intact  Conjunctiva/sclera: Conjunctivae normal       Pupils: Pupils are equal, round, and reactive to light  Neck:      Vascular: No carotid bruit  Cardiovascular:      Rate and Rhythm: Normal rate and regular rhythm  Heart sounds: Normal heart sounds  No murmur heard  Pulmonary:      Effort: Pulmonary effort is normal       Breath sounds: Normal breath sounds  No wheezing, rhonchi or rales  Abdominal:      General: Abdomen is flat  Bowel sounds are normal  There is no distension  Palpations: Abdomen is soft  Tenderness: There is no abdominal tenderness  There is no guarding or rebound  Musculoskeletal:         General: Tenderness present  No swelling or deformity  Right lower leg: No edema  Left lower leg: No edema  Comments: Patient with mild medial joint space of right knee  Negative Lachman's or Loreta's testing  Muscle strength 5/5  Lymphadenopathy:      Cervical: No cervical adenopathy  Skin:     Findings: No rash  Neurological:      General: No focal deficit present  Mental Status: He is alert and oriented to person, place, and time  Cranial Nerves: No cranial nerve deficit  Psychiatric:         Mood and Affect: Mood normal          Behavior: Behavior normal          Thought Content: Thought content normal          Judgment: Judgment normal          Pertinent Laboratory/Diagnostic Studies:  Lab Results   Component Value Date    GLUCOSE 100 07/31/2014    BUN 13 07/31/2014    CREATININE 0 84 07/31/2014    CALCIUM 9 6 07/31/2014     07/31/2014    K 4 4 07/31/2014    CO2 31 07/31/2014     07/31/2014     Lab Results   Component Value Date    ALT 35 07/31/2014    AST 24 07/31/2014    ALKPHOS 99 07/31/2014    BILITOT 0 80 07/31/2014       Lab Results   Component Value Date    WBC 6 73 07/31/2014    HGB 15 4 07/31/2014    HCT 45 8 07/31/2014    MCV 92 07/31/2014     07/31/2014       No results found for: TSH    Lab Results   Component Value Date    CHOL 215 09/30/2014     Lab Results   Component Value Date    TRIG 134 09/30/2014     Lab Results   Component Value Date    HDL 47 09/30/2014     Lab Results   Component Value Date    LDLCALC 141 (H) 09/30/2014     Lab Results   Component Value Date    HGBA1C 5 1 06/22/2018       No results found for this or any previous visit      Orders Placed This Encounter   Procedures    XR knee 3 vw left non injury    XR knee 3 vw right non injury    Lipid panel    TSH, 3rd generation    Comprehensive metabolic panel    PSA, Total Screen       ALLERGIES:  No Known Allergies    Current Medications     Current Outpatient Medications   Medication Sig Dispense Refill    etanercept (Enbrel) 50 mg/mL SOSY Inject 1 syringe subcutaneously once a week      Melatonin 10 MG TABS Take by mouth      Misc Natural Products (PROSTATE HEALTH PO) Take by mouth      Multiple Vitamins-Minerals (CENTRUM SILVER 50+MEN) TABS Take 1 tablet by mouth      Multiple Vitamins-Minerals (OCUVITE EXTRA) TABS Take 1 tablet by mouth daily      NON FORMULARY Place 1 tablet under the tongue 3 (three) times a day CBD powder mix with MCT Oil and place under tongue 2-3 times per day for anxiety      acetaminophen-codeine (TYLENOL #3) 300-30 mg per tablet Take 1 tablet by mouth every 8 (eight) hours as needed for moderate pain 30 tablet 0    predniSONE 20 mg tablet 2 tabs po q day 10 tablet 0     No current facility-administered medications for this visit           Health Maintenance     Health Maintenance   Topic Date Due    Hepatitis C Screening  Never done    HIV Screening  Never done    BMI: Followup Plan  Never done    Annual Physical  Never done    DTaP,Tdap,and Td Vaccines (1 - Tdap) 08/05/2015    Pneumococcal Vaccine: Pediatrics (0 to 5 Years) and At-Risk Patients (6 to 59 Years) (3 of 4 - PCV13) 08/04/2016    COVID-19 Vaccine (3 - Moderna risk 4-dose series) 05/04/2021    Depression Screening  03/22/2023    BMI: Adult  03/22/2023    Colorectal Cancer Screening  03/28/2024    Influenza Vaccine  Completed    HIB Vaccine  Aged Out    Hepatitis B Vaccine  Aged Out    IPV Vaccine  Aged Out    Hepatitis A Vaccine  Aged Out    Meningococcal ACWY Vaccine  Aged Out    HPV Vaccine  Aged Out     Immunization History   Administered Date(s) Administered    COVID-19 MODERNA VACC 0 5 ML IM 03/09/2021, 04/06/2021    INFLUENZA 11/06/2012, 02/18/2013, 02/19/2014, 02/11/2015, 10/09/2017, 10/26/2018, 10/22/2021    Influenza, injectable, quadrivalent, preservative free 0 5 mL 11/05/2019    Pneumococcal Polysaccharide PPV23 11/21/2005, 08/04/2015    Td (adult), adsorbed 10/01/2005, 12/50/3351       Chandler Larios MD

## 2022-03-22 NOTE — ASSESSMENT & PLAN NOTE
Knee pain  Patient will obtain x-ray of bilateral knees  He was given prednisone to use 40 mg once daily for 5 days  He was also given Tylenol No  3 to use every 8 hours as needed for pain management    We will make further recommendations pending results of test

## 2022-03-22 NOTE — ASSESSMENT & PLAN NOTE
Well adult  Overall the patient appears to be in stable health  His colonoscopy is up-to-date  He will obtain blood work as ordered for further evaluation

## 2022-03-24 ENCOUNTER — TELEPHONE (OUTPATIENT)
Dept: FAMILY MEDICINE CLINIC | Facility: CLINIC | Age: 62
End: 2022-03-24

## 2022-03-24 NOTE — TELEPHONE ENCOUNTER
----- Message from Rakesh Yates MD sent at 2/23/6428  9:27 AM EDT -----  All recent blood work stable for patient

## 2022-10-11 PROBLEM — Z00.00 PERIODIC HEALTH ASSESSMENT, GENERAL SCREENING, ADULT: Status: RESOLVED | Noted: 2022-03-22 | Resolved: 2022-10-11

## 2023-04-04 ENCOUNTER — TELEPHONE (OUTPATIENT)
Dept: NEUROLOGY | Facility: CLINIC | Age: 63
End: 2023-04-04

## 2023-04-04 NOTE — TELEPHONE ENCOUNTER
Pt called  Pt stated he was pt of John Calderón at [address] however pt is not a pt of [facility]  Pt has questions with regard to what insurance Luisa Richards accepts as pt will be changing insurances  Advised pt to contact the Sleep Center for answers to these question  Pt voiced understanding  Message sent to Luisa Richards 
I have this mass to my arm."

## 2023-06-15 ENCOUNTER — OFFICE VISIT (OUTPATIENT)
Dept: SLEEP CENTER | Facility: CLINIC | Age: 63
End: 2023-06-15
Payer: COMMERCIAL

## 2023-06-15 VITALS
HEART RATE: 70 BPM | SYSTOLIC BLOOD PRESSURE: 152 MMHG | WEIGHT: 172 LBS | BODY MASS INDEX: 26.07 KG/M2 | DIASTOLIC BLOOD PRESSURE: 76 MMHG | HEIGHT: 68 IN

## 2023-06-15 DIAGNOSIS — G47.33 OBSTRUCTIVE SLEEP APNEA TREATED WITH CONTINUOUS POSITIVE AIRWAY PRESSURE (CPAP): Primary | ICD-10-CM

## 2023-06-15 DIAGNOSIS — Z99.89 OBSTRUCTIVE SLEEP APNEA TREATED WITH CONTINUOUS POSITIVE AIRWAY PRESSURE (CPAP): Primary | ICD-10-CM

## 2023-06-15 PROCEDURE — 99214 OFFICE O/P EST MOD 30 MIN: CPT | Performed by: NURSE PRACTITIONER

## 2023-06-15 NOTE — PROGRESS NOTES
Progress Note - [address] [facility] 70 y o  male   QLP:[id number]/5739, MRN: [id number]  6/15/2023        Follow Up Evaluation / Problem:  Mild Obstructive Sleep Apnea      HPI: Isaura Reynoso is a 70y o  year old male  He presents for follow up of obstructive sleep apnea  He was diagnosed with very mild sleep apnea in 2007  AHI increased to 25 in REM sleep and he was symptomatic with snoring and daytime sleepiness  He has successfully used CPAP since receiving it in 2007, which controls his snoring and allows him to awaken feeling refreshed  He receives supplies from [employer] and cleans his equipment routinely  Current Outpatient Medications:   •  etanercept (Enbrel) 50 mg/mL SOSY, Inject 1 syringe subcutaneously once a week, Disp: , Rfl:   •  Melatonin 10 MG TABS, Take by mouth, Disp: , Rfl:   •  Misc Natural Products (PROSTATE HEALTH PO), Take by mouth, Disp: , Rfl:   •  Multiple Vitamins-Minerals (CENTRUM SILVER 50+MEN) TABS, Take 1 tablet by mouth, Disp: , Rfl:   •  Multiple Vitamins-Minerals (OCUVITE EXTRA) TABS, Take 1 tablet by mouth daily, Disp: , Rfl:   •  NON FORMULARY, Place 1 tablet under the tongue 3 (three) times a day CBD powder mix with MCT Oil and place under tongue 2-3 times per day for anxiety, Disp: , Rfl:   •  acetaminophen-codeine (TYLENOL #3) 300-30 mg per tablet, Take 1 tablet by mouth every 8 (eight) hours as needed for moderate pain (Patient not taking: Reported on 6/15/2023), Disp: 30 tablet, Rfl: 0  •  predniSONE 20 mg tablet, 2 tabs po q day (Patient not taking: Reported on 6/15/2023), Disp: 10 tablet, Rfl: 0    How likely are you to doze off or fall asleep in the following situations, in contrast to feeling just tired? Sitting and reading: Would never doze  Watching TV: Slight chance of dozing  Sitting, inactive in a public place (e g  a theatre or a meeting):  Would never doze  As a passenger in a car for an hour without a break: Would "never doze  Lying down to rest in the afternoon when circumstances permit: Moderate chance of dozing  Sitting and talking to someone: Would never doze  Sitting quietly after a lunch without alcohol: Would never doze  In a car, while stopped for a few minutes in traffic: Would never doze  Total score: 3              Vitals:    06/15/23 0950   BP: 152/76   BP Location: Left arm   Patient Position: Sitting   Cuff Size: Adult   Pulse: 70   Weight: 78 kg (172 lb)   Height: 5' 8\" (1 727 m)       Body mass index is 26 15 kg/m²  EPWORTH SLEEPINESS SCORE  Total score: 3      Past History Since Last Sleep Center Visit:   He denies any changes to his health over the past year  He has psoriatic arthritis that flares at times  He follows with rheumatology and uses [facility]  He continues to use his CPAP and reports that he doesn't sleep without it  Snoring is eliminated with use and he feels refreshed upon awakening  He reports that his wife sleeps with the TV on in their bedroom, which is disruptive to his sleep  The review of systems and following portions of the patient's history were reviewed and updated as appropriate: allergies, current medications, past family history, past medical history, past social history, past surgical history, and problem list     OBJECTIVE  Received equipment in 2007  PAP Pressure: 7cm  Mask type: Full face  DME Provider: [facility]    Physical Exam:     General Appearance:   Alert, cooperative, no distress, appears stated age     Lungs:    Heart:  Clear to auscultation bilaterally, respirations unlabored      Regular rate and rhythm, S1 and S2 normal, no murmur, rub or gallop              ASSESSMENT / PLAN    1  Obstructive sleep apnea treated with continuous positive airway pressure (CPAP)  Diagnostic Sleep Study              Counseling / Coordination of Care  Total clinic time spent today 30 minutes   Greater than 50% of total time was spent with the " patient and / or family counseling and / or coordination of care  A description of the counseling / coordination of care:     Impressions, Diagnostic results, Prognosis, Instructions for management, Risks and benefits of treatment, Patient and family education, Risk factor reductions and Importance of compliance with treatment    Today we discussed his continued use of CPAP  He currently feels comfortable using his CPAP equipment  The equipment is old and compliance report is not able to be obtained to review effectiveness of treatment  He is agreeable to complete a diagnostic sleep study  He will avoid use of the equipment for one week prior to testing  Up until that time, he plans to continue to use his current CPAP equipment  He has an abundance of supplies  The following instructions have been given to the patient today:    Patient Instructions   1  Schedule diagnostic sleep study   2  Schedule DME appointment for CPAP equipment, if needed  3  Schedule follow up visit for CPAP compliance and recheck       Nursing Support:  When: Monday through Friday 7A-5PM except holidays  Where: Our direct line is [phone]  If you are having a true emergency please call 911  In the event that the line is busy or it is after hours please leave a voice message and we will return your call  Please speak clearly, leaving your full name, birth date, best number to reach you and the reason for your call  Medication refills: We will need the name of the medication, the dosage, the ordering provider, whether you get a 30 or 90 day refill, and the pharmacy name and address  Medications will be ordered by the provider only  Nurses cannot call in prescriptions  Please allow 7 days for medication refills  Physician requested updates:  If your provider requested that you call with an update after starting medication, please be ready to provide us the medication and dosage, what time you take your medication, the time you attempt to fall asleep, time you fall asleep, when you wake up, and what time you get out of bed  Sleep Study Results: We will contact you with sleep study results and/or next steps after the physician has reviewed your testing          Rashmi Cisneros, [address] [facility]

## 2023-06-15 NOTE — PATIENT INSTRUCTIONS
1  Schedule diagnostic sleep study   2  Schedule DME appointment for CPAP equipment, if needed  3  Schedule follow up visit for CPAP compliance and recheck       Nursing Support:  When: Monday through Friday 7A-5PM except holidays  Where: Our direct line is [phone]  If you are having a true emergency please call 911  In the event that the line is busy or it is after hours please leave a voice message and we will return your call  Please speak clearly, leaving your full name, birth date, best number to reach you and the reason for your call  Medication refills: We will need the name of the medication, the dosage, the ordering provider, whether you get a 30 or 90 day refill, and the pharmacy name and address  Medications will be ordered by the provider only  Nurses cannot call in prescriptions  Please allow 7 days for medication refills  Physician requested updates: If your provider requested that you call with an update after starting medication, please be ready to provide us the medication and dosage, what time you take your medication, the time you attempt to fall asleep, time you fall asleep, when you wake up, and what time you get out of bed  Sleep Study Results: We will contact you with sleep study results and/or next steps after the physician has reviewed your testing

## 2023-07-21 ENCOUNTER — OFFICE VISIT (OUTPATIENT)
Dept: FAMILY MEDICINE CLINIC | Facility: CLINIC | Age: 63
End: 2023-07-21
Payer: COMMERCIAL

## 2023-07-21 VITALS
TEMPERATURE: 98.2 F | DIASTOLIC BLOOD PRESSURE: 70 MMHG | OXYGEN SATURATION: 96 % | SYSTOLIC BLOOD PRESSURE: 130 MMHG | WEIGHT: 175 LBS | RESPIRATION RATE: 16 BRPM | BODY MASS INDEX: 26.52 KG/M2 | HEIGHT: 68 IN | HEART RATE: 61 BPM

## 2023-07-21 DIAGNOSIS — S89.91XA INJURY OF RIGHT LOWER EXTREMITY, INITIAL ENCOUNTER: Primary | ICD-10-CM

## 2023-07-21 DIAGNOSIS — L40.50 PSORIATIC ARTHRITIS (HCC): ICD-10-CM

## 2023-07-21 PROCEDURE — 99213 OFFICE O/P EST LOW 20 MIN: CPT | Performed by: NURSE PRACTITIONER

## 2023-07-21 NOTE — PROGRESS NOTES
FAMILY PRACTICE OFFICE VISIT       NAME: Tu Rodriguez  AGE: 70 y.o. SEX: male       : 1960        MRN: [id number]    Assessment and Plan   1. Injury of right lower extremity, initial encounter  Right lower extremity--medial lower leg, proximal pre-tibal region with approximate 3 cm hematoma, after being hit with a golf ball in this area. Tibia is tender, but he is able to fully bear weight without pain, right medial ankle has +1 pitting edema, there is ecchymosis just below the medial and lateral malleolus regions of the foot. Neurovascular exam is unremarkable of right lower leg. Suspect this is limited to hematoma, and will self resolve over the next few weeks. Cannot completely exclude fracture of tibia, will complete x-ray. -     XR tibia fibula 2 vw right; Future; Expected date: 2023    2. Psoriatic arthritis ([address])  Controlled on Enbrel. Follows with rheumatology, Dr. Zohra Gaona. Chief Complaint     Chief Complaint   Patient presents with   • Mass     Pt is here for rt calf lump 4 day       History of Present Illness     Monday hit with golf ball right medial eg, using compression sock and Icing. Feelin a little bet better. Able to bear weigh t    puldses good. .   righ tankle edema and lower foot bruised. Review of Systems   Review of Systems   Constitutional: Negative. Musculoskeletal: Positive for arthralgias (right lower leg injury). I have reviewed the patient's medical history in detail; there are no changes to the history as noted in the electronic medical record. Objective     Vitals:    23 1342   BP: 130/70   Pulse: 61   Resp: 16   Temp: 98.2 °F (36.8 °C)   TempSrc: Temporal   SpO2: 96%   Weight: 79.4 kg (175 lb)   Height: 5' 8" (1.727 m)     Wt Readings from Last 3 Encounters:   23 79.4 kg (175 lb)   06/15/23 78 kg (172 lb)   22 78.9 kg (174 lb)     Physical Exam  Vitals and nursing note reviewed.    Constitutional: General: He is not in acute distress. Appearance: Normal appearance. He is not ill-appearing. Cardiovascular:      Pulses:           Dorsalis pedis pulses are 2+ on the right side and 2+ on the left side. Musculoskeletal:      Comments: Right lower extremity--medial lower leg, proximal pre-tibal region with approximate 3 cm hematoma, after being hit with a golf ball in this area. Tibia is tender, but he is able to fully bear weight without pain, right medial ankle has +1 pitting edema, there is ecchymosis just below the medial and lateral malleolus regions of the foot. Peal pulse +2/3. Brisk CRT. Foot is warm to touch, pink. No loss of motion of lower extremity. Neurological:      Mental Status: He is alert. ALLERGIES:  No Known Allergies    Current Medications     Current Outpatient Medications   Medication Sig Dispense Refill   • etanercept (Enbrel) 50 mg/mL SOSY Inject 1 syringe subcutaneously once a week     • Melatonin 10 MG TABS Take by mouth     • Misc Natural Products (PROSTATE HEALTH PO) Take by mouth     • Multiple Vitamins-Minerals (CENTRUM SILVER 50+MEN) TABS Take 1 tablet by mouth     • Multiple Vitamins-Minerals (OCUVITE EXTRA) TABS Take 1 tablet by mouth daily     • NON FORMULARY Place 1 tablet under the tongue 3 (three) times a day CBD powder mix with MCT Oil and place under tongue 2-3 times per day for anxiety     • acetaminophen-codeine (TYLENOL #3) 300-30 mg per tablet Take 1 tablet by mouth every 8 (eight) hours as needed for moderate pain (Patient not taking: Reported on 6/15/2023) 30 tablet 0     No current facility-administered medications for this visit.          Health Maintenance     Health Maintenance   Topic Date Due   • Hepatitis C Screening  Never done   • HIV Screening  Never done   • BMI: Followup Plan  Never done   • DTaP,Tdap,and Td Vaccines (1 - Tdap) 08/05/2015   • Pneumococcal Vaccine: 65+ Years (3 - PCV) 08/04/2016   • COVID-19 Vaccine (3 - Moderna risk series) 05/04/2021   • Annual Physical  03/22/2023   • Fall Risk  Never done   • Influenza Vaccine (1) 09/01/2023   • Colorectal Cancer Screening  03/28/2024   • Depression Screening  07/21/2024   • BMI: Adult  07/21/2024   • HIB Vaccine  Aged Out   • IPV Vaccine  Aged Out   • Hepatitis A Vaccine  Aged Out   • Meningococcal ACWY Vaccine  Aged Out   • HPV Vaccine  Aged Out     Immunization History   Administered Date(s) Administered   • COVID-19 MODERNA VACC 0.5 ML IM 03/09/2021, 04/06/2021   • INFLUENZA 11/06/2012, 02/18/2013, 02/19/2014, 02/11/2015, 10/09/2017, 10/26/2018, 10/22/2021, 11/02/2022   • Influenza, injectable, quadrivalent, preservative free 0.5 mL 11/05/2019   • Pneumococcal Polysaccharide PPV23 11/21/2005, 08/04/2015   • Td (adult), adsorbed 10/01/2005, 08/04/2015   • Zoster Vaccine Recombinant 12/07/2022, 03/31/2023       JACQUI Parks

## 2024-02-06 ENCOUNTER — HOSPITAL ENCOUNTER (OUTPATIENT)
Dept: SLEEP CENTER | Facility: CLINIC | Age: 64
Discharge: HOME | End: 2024-02-06
Payer: COMMERCIAL

## 2024-02-06 DIAGNOSIS — G47.33 OBSTRUCTIVE SLEEP APNEA TREATED WITH CONTINUOUS POSITIVE AIRWAY PRESSURE (CPAP): ICD-10-CM

## 2024-02-06 PROCEDURE — 95810 POLYSOM 6/> YRS 4/> PARAM: CPT

## 2024-02-06 PROCEDURE — 95810 POLYSOM 6/> YRS 4/> PARAM: CPT | Performed by: PSYCHIATRY & NEUROLOGY

## 2024-02-07 NOTE — PROGRESS NOTES
Sleep Study Documentation    Pre-Sleep Study       Sleep testing procedure explained to patient:YES    Patient napped prior to study:NO    Caffeine:Dayshift worker after 12PM.  Caffeine use:YES- tea  6 to 18 ounces    Alcohol:Dayshift workers after 5PM: Alcohol use:NO    Typical day for patient:YES       Study Documentation    Sleep Study Indications: Witnessed gasping, unrefreshing sleep    Sleep Study: Diagnostic   Snore:Moderate  Supplemental O2: no      Minimum SaO2 89%  Baseline SaO2 95%    EKG abnormalities: yes:  EPOCH example and comments: Cardiac arrhythmias observed, epoch #316    EEG abnormalities: no    Were abnormal behaviors in sleep observed:NO    Is Total Sleep Study Recording Time < 2 hours: N/A    Is Total Sleep Study Recording Time > 2 hours but study is incomplete: N/A    Is Total Sleep Study Recording Time 6 hours or more but sleep was not obtained: NO    Patient classification: employed       Post-Sleep Study    Medication used at bedtime or during sleep study:YES over the counter sleep aid    Patient reports time it took to fall asleep:20 to 30 minutes    Patient reports waking up during study:3 or more times.  Patient reports returning to sleep in 10 to 30 minutes.    Patient reports sleeping 4 to 6 hours without dreaming.    Does the Patient feel this is a typical night of sleep:typical    Patient rated sleepiness: Not sleepy or tired    PAP treatment:no.

## 2024-02-11 PROBLEM — G47.8 OTHER SLEEP DISORDERS: Status: ACTIVE | Noted: 2024-02-11

## 2024-03-07 ENCOUNTER — TELEPHONE (OUTPATIENT)
Dept: SLEEP CENTER | Facility: CLINIC | Age: 64
End: 2024-03-07

## 2024-03-07 NOTE — TELEPHONE ENCOUNTER
----- Message from JACQUI Jose sent at 2/21/2024  8:19 PM EST -----  Sleep apnea was not confirmed during this diagnostic sleep study.  It is noted that he did not sleep well during the study.  Sleep apnea was noted in the supine position and was severe when supine, but overall, the number of events averaged only 3.0, which does not confirm sleep apnea.  Further discussion of results, current symptoms and plan of treatment is needed.  Please schedule patient for follow up visit.

## 2024-04-04 ENCOUNTER — OFFICE VISIT (OUTPATIENT)
Dept: SLEEP CENTER | Facility: CLINIC | Age: 64
End: 2024-04-04
Payer: COMMERCIAL

## 2024-04-04 VITALS
SYSTOLIC BLOOD PRESSURE: 120 MMHG | BODY MASS INDEX: 24.86 KG/M2 | DIASTOLIC BLOOD PRESSURE: 60 MMHG | WEIGHT: 164 LBS | HEIGHT: 68 IN

## 2024-04-04 DIAGNOSIS — G47.33 OSA (OBSTRUCTIVE SLEEP APNEA): Primary | ICD-10-CM

## 2024-04-04 PROCEDURE — 99213 OFFICE O/P EST LOW 20 MIN: CPT | Performed by: NURSE PRACTITIONER

## 2024-04-04 NOTE — PATIENT INSTRUCTIONS
1.  Continue use of CPAP equipment nightly, at your discretion  2.  Continue to clean your equipment, as discussed  3.  Contact the [facility] with any questions or concerns prior to your next visit, as needed  4.  Schedule visit for follow-up in 1 year    5.  If you need to replace your CPAP equipment, you will need to repeat a sleep study to re-qualify  6.  You could consider using a sleep bumper belt, such as Zzoma, which you can purchase online  7.  If you do stop use of CPAP and you are having return of symptoms, call with an update and a new study can be ordered.        Nursing Support:  When: Monday through Friday 7A-5PM except holidays  Where: Our direct line is [phone].    If you are having a true emergency please call 911.  In the event that the line is busy or it is after hours please leave a voice message and we will return your call.  Please speak clearly, leaving your full name, birth date, best number to reach you and the reason for your call.   Medication refills: We will need the name of the medication, the dosage, the ordering provider, whether you get a 30 or 90 day refill, and the pharmacy name and address.  Medications will be ordered by the provider only.  Nurses cannot call in prescriptions.  Please allow 7 days for medication refills.  Physician requested updates: If your provider requested that you call with an update after starting medication, please be ready to provide us the medication and dosage, what time you take your medication, the time you attempt to fall asleep, time you fall asleep, when you wake up, and what time you get out of bed.  Sleep Study Results: We will contact you with sleep study results and/or next steps after the physician has reviewed your testing.

## 2024-04-04 NOTE — PROGRESS NOTES
Progress Note - [facility]   Dez Marrufo 63 y.o. male   :1960, MRN: [id number]  2024        Follow Up Evaluation / Problem:  Mild Obstructive Sleep Apnea      HPI: Dez Marrufo is a 63 y.o. year old male.  He presents for follow up of obstructive sleep apnea.  He was diagnosed with very mild sleep apnea in .  AHI increased to 25 in REM sleep and he was symptomatic with snoring and daytime sleepiness.  He has successfully used CPAP since receiving it in , which controls his snoring and allows him to awaken feeling refreshed.  He receives supplies from [employer] and cleans his equipment routinely.  He lost approximately 30 lbs since the time of his initial testing.  A diagnostic sleep study was completed to re-evaluate his MONICO.      Current Outpatient Medications:     etanercept (Enbrel) 50 mg/mL SOSY, Inject 1 syringe subcutaneously once a week, Disp: , Rfl:     Melatonin 10 MG TABS, Take by mouth, Disp: , Rfl:     Misc Natural Products (PROSTATE HEALTH PO), Take by mouth, Disp: , Rfl:     Multiple Vitamins-Minerals (CENTRUM SILVER 50+MEN) TABS, Take 1 tablet by mouth, Disp: , Rfl:     Multiple Vitamins-Minerals (OCUVITE EXTRA) TABS, Take 1 tablet by mouth daily, Disp: , Rfl:     NON FORMULARY, Place 1 tablet under the tongue 3 (three) times a day CBD powder mix with MCT Oil and place under tongue 2-3 times per day for anxiety, Disp: , Rfl:     acetaminophen-codeine (TYLENOL #3) 300-30 mg per tablet, Take 1 tablet by mouth every 8 (eight) hours as needed for moderate pain (Patient not taking: Reported on 6/15/2023), Disp: 30 tablet, Rfl: 0    How likely are you to doze off or fall asleep in the following situations, in contrast to feeling just tired?  Sitting and reading: Would never doze  Watching TV: Moderate chance of dozing  Sitting, inactive in a public place (e.g. a theatre or a meeting): Would never doze  As a passenger in a car for an hour without a  "break: Would never doze  Lying down to rest in the afternoon when circumstances permit: High chance of dozing  Sitting and talking to someone: Would never doze  Sitting quietly after a lunch without alcohol: Would never doze  In a car, while stopped for a few minutes in traffic: Would never doze  Total score: 5              Vitals:    04/04/24 1522   BP: 120/60   Weight: 74.4 kg (164 lb)   Height: 5' 8\" (1.727 m)       Body mass index is 24.94 kg/m².       EPWORTH SLEEPINESS SCORE  Total score: 5      Past History Since Last Sleep Center Visit:   He denies any changes to his health over the past year. He reports that he continues to use CPAP equipment.  The equipment is very old and compliance data is not available.  A diagnostic study was ordered and completed.  He was advised to avoid use of CPAP equipment for at least one week prior to his sleep study.    Results of the diagnostic sleep study, completed in February 2024, are as follows:  Notes- the patient did not sleep well and requested that the test end early on two occasions.      IMPRESSION:   This test does not support the diagnosis of obstructive sleep apnea as the apnea hypopnea index (AHI) was normal (Normal AHI  is less than 5)  Normal baseline oxygen saturation.   Sleep efficiency was very low. Stage N1 sleep  percentage (light sleep) was increased. Stage N3 sleep (deep sleep) was increased. REM sleep percentage was decreased on this test.   Increased periodic limb movements (PLM) during sleep. These did not contribute to significant sleep disruption and cortical arousals.   Average pulse was normal .  EKG showed normal sinus rhythm. Rare PVCs observed.     RECOMMENDATION:  It is noted that the patient has a history of obstructive sleep apnea and had been using CPAP consistently.  If this result is felt to be a false negative/ambiguous, a repeat test can be considered.      The review of systems and following portions of the patient's history were " reviewed and updated as appropriate: allergies, current medications, past family history, past medical history, past social history, past surgical history, and problem list.    OBJECTIVE  Received equipment in 2007  PAP Pressure: 7cm  Mask type:  Full face  DME Provider: [facility]    Physical Exam:     General Appearance:   Alert, cooperative, no distress, appears stated age     Lungs:    Heart:  Clear to auscultation bilaterally, respirations unlabored      Regular rate and rhythm, S1 and S2 normal, no murmur, rub or gallop              ASSESSMENT / PLAN    1. MONICO (obstructive sleep apnea)  PAP DME Resupply/Reorder                Counseling / Coordination of Care  I have spent a total time of 25 minutes on 04/04/24 in caring for this patient including Risks and benefits of tx options, Instructions for management, Patient and family education, Importance of tx compliance, Risk factor reductions, Impressions, Counseling / Coordination of care, Documenting in the medical record, and Reviewing / ordering tests, medicine, procedures  .      Today we reviewed the results of the diagnostic sleep study in detail.  Sleep efficiency was poor at only 62.6%.  There was minimal supine sleep during the test, however, he reports that he does not sleep supine at home.  We discussed that respiratory events were noted, but not enough to confirm a diagnosis of MONICO.  He feels comfortable using CPAP equipment, but feels he sleeps the same if he does not use it.  He plans to try sleeping without it, but would like a prescription for supplies in case he would like to use it.  He reports that the current mask and settings feel comfortable.  He understands that new equipment can't be prescribed without a confirmational diagnosis.  He does not wish to complete a repeat sleep study at this time. He was advised to call if symptoms are noted or worsen.  We also discussed use of Zzoma or similar to prevent accidental  supine sleep.      The following instructions have been given to the patient today:    Patient Instructions   1.  Continue use of CPAP equipment nightly, at your discretion  2.  Continue to clean your equipment, as discussed  3.  Contact the Sleep Disorders Center with any questions or concerns prior to your next visit, as needed  4.  Schedule visit for follow-up in 1 year    5.  If you need to replace your CPAP equipment, you will need to repeat a sleep study to re-qualify  6.  You could consider using a sleep bumper belt, such as Zzoma, which you can purchase online  7.  If you do stop use of CPAP and you are having return of symptoms, call with an update and a new study can be ordered.        Nursing Support:  When: Monday through Friday 7A-5PM except holidays  Where: Our direct line is [phone].    If you are having a true emergency please call 911.  In the event that the line is busy or it is after hours please leave a voice message and we will return your call.  Please speak clearly, leaving your full name, birth date, best number to reach you and the reason for your call.   Medication refills: We will need the name of the medication, the dosage, the ordering provider, whether you get a 30 or 90 day refill, and the pharmacy name and address.  Medications will be ordered by the provider only.  Nurses cannot call in prescriptions.  Please allow 7 days for medication refills.  Physician requested updates: If your provider requested that you call with an update after starting medication, please be ready to provide us the medication and dosage, what time you take your medication, the time you attempt to fall asleep, time you fall asleep, when you wake up, and what time you get out of bed.  Sleep Study Results: We will contact you with sleep study results and/or next steps after the physician has reviewed your testing.      JACQUI Jose  [facility]

## 2024-04-08 ENCOUNTER — TELEPHONE (OUTPATIENT)
Dept: SLEEP CENTER | Facility: CLINIC | Age: 64
End: 2024-04-08

## 2024-04-08 LAB

## 2024-06-27 ENCOUNTER — TELEPHONE (OUTPATIENT)
Dept: FAMILY MEDICINE CLINIC | Facility: CLINIC | Age: 64
End: 2024-06-27

## 2024-06-30 NOTE — TELEPHONE ENCOUNTER
06/30/24 12:42 AM        The office's request has been received, reviewed, and the patient chart updated. The PCP has successfully been removed with a patient attribution note. This message will now be completed.        Thank you  Silvia Alfonso

## 2025-04-09 ENCOUNTER — OFFICE VISIT (OUTPATIENT)
Dept: SLEEP CENTER | Facility: CLINIC | Age: 65
End: 2025-04-09
Payer: COMMERCIAL

## 2025-04-09 VITALS
OXYGEN SATURATION: 99 % | WEIGHT: 174 LBS | SYSTOLIC BLOOD PRESSURE: 120 MMHG | HEART RATE: 70 BPM | DIASTOLIC BLOOD PRESSURE: 58 MMHG | BODY MASS INDEX: 26.37 KG/M2 | HEIGHT: 68 IN

## 2025-04-09 DIAGNOSIS — G47.00 INSOMNIA, UNSPECIFIED TYPE: ICD-10-CM

## 2025-04-09 DIAGNOSIS — F41.9 ANXIETY: ICD-10-CM

## 2025-04-09 DIAGNOSIS — G47.33 OSA (OBSTRUCTIVE SLEEP APNEA): Primary | ICD-10-CM

## 2025-04-09 PROCEDURE — 99214 OFFICE O/P EST MOD 30 MIN: CPT | Performed by: NURSE PRACTITIONER

## 2025-04-09 PROCEDURE — G2211 COMPLEX E/M VISIT ADD ON: HCPCS | Performed by: NURSE PRACTITIONER

## 2025-04-09 NOTE — ASSESSMENT & PLAN NOTE
He continues to use CPAP equipment nightly.  He feels the current mask and settings are comfortable.  He does not snore when using the CPAP equipment.  He admits that using it is bothersome and if he wasn't concerned with disturbing his wife with his snoring, he would likely not even use the equipment.    His equipment is an old ResMed CPAP with no SD care or means of obtaining a compliance report.  Equipment settings do not allow for AutoPAP.  He will complete a home sleep study.  Hopefully sleep efficiency will be better in his home environment.  If MONICO is confirmed, he would continue use of PAP therapy, with plan for new AutoPAP equipment, which may be more comfortable for him.  We discussed other options for treatment, such as positional therapy.  He has an adjustable bed, but still notes snoring.  We discussed a mandible advancement appliance.  He does not feel he could keep this in his mouth, as he has a strong gag reflex.   Orders:    Home Study; Future

## 2025-04-09 NOTE — PATIENT INSTRUCTIONS
Patient Instructions:     Schedule home sleep study  After testing, the nurse will call with results and recommendations for plan of treatment   You may want to try the magnesium glycinate formulation  You may want to try Natureyoan Moralesanda Stress and Anxiety supplement  Avoid use of alcohol within 4 hours of bedtime. This can disrupt sleep as it is metabolized  Work with your PCP regarding anxiety or consider referral to behavioral health      Thank you for trusting me with your care!    I know we often  cover a lot of information at the visit, so if you have follow-up questions, are unclear about the plan, or feel there were important items that we did not discuss or you did not receive clarity on, please don't hesitate to reach out to me.     [employer] messages are preferred for routine matters.  Please make sure to call for urgent matters as there can be a delay in responding to questions over [employer].    IMPORTANT- Prior to a sleep study (at home or in the sleep lab), I strongly recommend contacting your medical insurance first to understand your benefits (including deductible if applicable), coverage for this test, and out of pocket costs.  Even if the test is approved by medical insurance, the cost to you is determined by your medical benefits.   If you have concerns about your out of pocket costs for sleep testing, please contact me/the office before you complete the test and we can discuss if there are alternate options.     I recommend following this advice in general before any lab test, imaging test, doctor visit, surgery, or ordering CPAP supplies as it is best to understand your coverage to avoid unexpected bills after the fact.       Nursing Support:  When: Monday through Friday 7:30A-4:30PM except holidays  Where: Our direct line is [phone]  *3  *1.      If you are having a true emergency please call 911.  In the event that the line is busy or it is after hours please leave a voice message  and we will return your call.  Please speak clearly, leaving your full name, birth date, best number to reach you and the reason for your call.   Medication refills: We will need the name of the medication, the dosage, the ordering provider, whether you get a 30 or 90 day refill, and the pharmacy name and address.  Medications will be ordered by the provider only.  Nurses cannot call in prescriptions.  Please allow 7 days for medication refills.  Physician requested updates: If your provider requested that you call with an update after starting medication, please be ready to provide us the medication and dosage, what time you take your medication, the time you attempt to fall asleep, time you fall asleep, when you wake up, and what time you get out of bed.  Sleep Study Results: We will contact you with sleep study results and/or next steps after the physician has reviewed your testing.

## 2025-04-09 NOTE — ASSESSMENT & PLAN NOTE
He reports that his sleep is very restless.  He wakes up multiple times during the night.  He now tracks awakenings on his phone from his Sleep Number bed.  He is not sure what causes the restlessness.  We discussed that the CPAP equipment may be causing the awakenings.  We discussed avoiding use of alcohol in the evenings.  We discussed avoiding use of caffeine after the early afternoon.

## 2025-04-09 NOTE — ASSESSMENT & PLAN NOTE
He feels very anxious, especially at night if he can't sleep or return to sleep.  He reports that he has discussed anxiety with his PCP and has tried an SSRI, but did not feel well when taking it.    We reviewed the association between sleep, mood, and coexisting psychiatric disorders. We discussed the importance of obtaining adequate sleep of at least 7 hours nightly. Patient was encouraged to continue current prescribed medications and to continue follow up with their PCP/psychiatrist for further management.

## 2025-04-09 NOTE — PROGRESS NOTES
Name: Dez Marrufo      : 1960      MRN: [id number]  Encounter Provider: JACQUI Jose  Encounter Date: 2025   Encounter department: [facility]  :  Assessment & Plan  MONICO (obstructive sleep apnea)  He continues to use CPAP equipment nightly.  He feels the current mask and settings are comfortable.  He does not snore when using the CPAP equipment.  He admits that using it is bothersome and if he wasn't concerned with disturbing his wife with his snoring, he would likely not even use the equipment.    His equipment is an old ResMed CPAP with no SD care or means of obtaining a compliance report.  Equipment settings do not allow for AutoPAP.  He will complete a home sleep study.  Hopefully sleep efficiency will be better in his home environment.  If MONICO is confirmed, he would continue use of PAP therapy, with plan for new AutoPAP equipment, which may be more comfortable for him.  We discussed other options for treatment, such as positional therapy.  He has an adjustable bed, but still notes snoring.  We discussed a mandible advancement appliance.  He does not feel he could keep this in his mouth, as he has a strong gag reflex.   Orders:    Home Study; Future    Insomnia, unspecified type  He reports that his sleep is very restless.  He wakes up multiple times during the night.  He now tracks awakenings on his phone from his Sleep Number bed.  He is not sure what causes the restlessness.  We discussed that the CPAP equipment may be causing the awakenings.  We discussed avoiding use of alcohol in the evenings.  We discussed avoiding use of caffeine after the early afternoon.         Anxiety  He feels very anxious, especially at night if he can't sleep or return to sleep.  He reports that he has discussed anxiety with his PCP and has tried an SSRI, but did not feel well when taking it.    We reviewed the association between sleep, mood, and coexisting psychiatric disorders. We  discussed the importance of obtaining adequate sleep of at least 7 hours nightly. Patient was encouraged to continue current prescribed medications and to continue follow up with their PCP/psychiatrist for further management.            History of Present Illness   Dez Marrufo is a 64 y.o. year old male, who presents for follow up of obstructive sleep apnea.  He was diagnosed with very mild sleep apnea in 2007.  AHI increased to 25 in REM sleep and he was symptomatic with snoring and daytime sleepiness.   He receives supplies from [employer] and cleans his equipment routinely.  He lost approximately 30 lbs since the time of his initial testing.  A diagnostic sleep study was completed in February 2024, to re-evaluate his MONICO.  Testing did not confirm MONICO, however, sleep efficiency was poor in the sleep lab.  He continues to use old CPAP equipment nightly to treat his snoring, to avoid disrupting his wife's sleep.              Sitting and reading: Would never doze  Watching TV: Would never doze  Sitting, inactive in a public place (e.g. a theatre or a meeting): Would never doze  As a passenger in a car for an hour without a break: Would never doze  Lying down to rest in the afternoon when circumstances permit: Slight chance of dozing  Sitting and talking to someone: Would never doze  Sitting quietly after a lunch without alcohol: Would never doze  In a car, while stopped for a few minutes in traffic: Would never doze  Total score: 1     Review of Systems   All other systems reviewed and are negative.    Pertinent positives/negatives included in HPI and also as noted:     Medical History Reviewed by provider this encounter:  Tobacco  Allergies  Meds  Problems  Med Hx  Surg Hx  Fam Hx     .  Current Outpatient Medications on File Prior to Visit   Medication Sig Dispense Refill    etanercept (Enbrel) 50 mg/mL SOSY Inject 1 syringe subcutaneously once a week      Melatonin 10 MG TABS Take by mouth (Patient taking  "differently: Take 10 mg by mouth daily at bedtime)      Misc Natural Products (PROSTATE HEALTH PO) Take by mouth      Multiple Vitamins-Minerals (CENTRUM SILVER 50+MEN) TABS Take 1 tablet by mouth      Multiple Vitamins-Minerals (OCUVITE EXTRA) TABS Take 1 tablet by mouth daily      NON FORMULARY Place 1 tablet under the tongue 3 (three) times a day CBD powder mix with MCT Oil and place under tongue 2-3 times per day for anxiety      [DISCONTINUED] acetaminophen-codeine (TYLENOL #3) 300-30 mg per tablet Take 1 tablet by mouth every 8 (eight) hours as needed for moderate pain (Patient not taking: Reported on 6/15/2023) 30 tablet 0     No current facility-administered medications on file prior to visit.      Objective   /58 (BP Location: Left arm, Patient Position: Sitting, Cuff Size: Large)   Pulse 70   Ht 5' 8\" (1.727 m)   Wt 78.9 kg (174 lb)   SpO2 99%   BMI 26.46 kg/m²        Physical Exam    Constitutional: Alert, cooperative, no distress, appears stated age  Skin: Warm, dry, no rashes noted  Lungs: Clear to auscultation bilaterally, respirations unlabored  Heart: normal rate and regular rhythm, S1/2 normal, no murmur noted, no rub or gallop  Extremities: Normal, no digital clubbing, no pedal edema  Neuro: No focal deficits noted    Data  Lab Results   Component Value Date    HGB 15.4 07/31/2014    HCT 45.8 07/31/2014    MCV 92 07/31/2014      Lab Results   Component Value Date    GLUCOSE 100 07/31/2014    CALCIUM 10.2 02/24/2025     07/31/2014    K 4.3 02/24/2025    CO2 28 02/24/2025     02/24/2025    BUN 18 02/24/2025    CREATININE 0.89 02/24/2025     No results found for: \"IRON\", \"TIBC\", \"FERRITIN\"  Lab Results   Component Value Date    AST 17 02/24/2025    ALT 20 02/24/2025       Administrative Statements   I have spent a total time of 30 minutes in caring for this patient on the day of the visit/encounter including Risks and benefits of tx options, Instructions for management, Patient " and family education, Importance of tx compliance, Risk factor reductions, Impressions, Counseling / Coordination of care, Documenting in the medical record, and Reviewing/placing orders in the medical record (including tests, medications, and/or procedures).

## 2025-05-04 ENCOUNTER — HOSPITAL ENCOUNTER (OUTPATIENT)
Dept: SLEEP CENTER | Facility: CLINIC | Age: 65
Discharge: HOME | End: 2025-05-04

## 2025-05-05 NOTE — PROGRESS NOTES
Monitor Summary     SR/A-fib    Burst of bundle   0.19/0.06/0.46           Patient arrived for his HST appointment, however, we didn't have the equipment to give him for tonight's study as the previous patient had not returned it. Assured patient that the office will contact him with another appointment.

## 2025-05-29 ENCOUNTER — HOSPITAL ENCOUNTER (OUTPATIENT)
Dept: SLEEP CENTER | Facility: CLINIC | Age: 65
Discharge: HOME | End: 2025-05-29
Attending: NURSE PRACTITIONER
Payer: COMMERCIAL

## 2025-05-29 DIAGNOSIS — G47.33 OSA (OBSTRUCTIVE SLEEP APNEA): ICD-10-CM

## 2025-05-29 PROCEDURE — G0399 HOME SLEEP TEST/TYPE 3 PORTA: HCPCS

## 2025-05-29 NOTE — PROGRESS NOTES
Home Sleep Study Documentation    HOME STUDY DEVICE: Noxturnal no                                           Denise G3 yes device # 19      Pre-Sleep Home Study:    Set-up and instructions performed by: Randi    Technician performed demonstration for Patient: yes    Return demonstration performed by Patient: yes    Written instructions provided to Patient: yes    Patient signed consent form: yes          Post-Sleep Home Study:    Post Test Questionnaire Data: On the post-study questionnaire, the patient estimated 6 hours of sleep during this test, with several awakenings. Please refer to scanned form for additional comments on alcohol use on day of test, medications, and/or activities during awakenings    Additional comments by Patient:     Home Sleep Study Failed:no:    Failure reason: N/A    Reported or Detected: N/A    Scored by: NILE Styles

## 2025-06-06 ENCOUNTER — RESULTS FOLLOW-UP (OUTPATIENT)
Dept: SLEEP CENTER | Facility: CLINIC | Age: 65
End: 2025-06-06

## 2025-06-06 DIAGNOSIS — G47.33 OSA (OBSTRUCTIVE SLEEP APNEA): Primary | ICD-10-CM

## 2025-06-06 DIAGNOSIS — G47.01 INSOMNIA DUE TO MEDICAL CONDITION: ICD-10-CM

## 2025-06-06 DIAGNOSIS — F41.9 ANXIETY: ICD-10-CM

## 2025-06-12 NOTE — TELEPHONE ENCOUNTER
----- Message from JACQUI Jose sent at 6/6/2025  7:54 AM EDT -----  Mild obstructive sleep apnea was confirmed during the home sleep study and is likely contributing to symptoms.  Recommend trial of auto-CPAP.  A prescription for equipment has been provided.  Patient   to be scheduled for set up of equipment, followed by compliance follow up 31-91 days after set up.  Please remind the patient to NOT ACCEPT any appointment earlier than 31 days after receiving and   starting use of the PAP equipment.    ----- Message -----  From: Garry Ivan MD  Sent: 6/4/2025   7:27 PM EDT  To: JACQUI Jose

## 2025-06-12 NOTE — TELEPHONE ENCOUNTER
Home sleep study resulted and shows mild sleep apnea, worse in supine position with event related desaturations. ALEYDA 7.9. CPAP ordered.     Called patient and left message advising I will send a [employer] message with the sleep study results and next steps.  Provided sleep center number([phone]) to call if any questions.